# Patient Record
Sex: MALE | Race: WHITE | NOT HISPANIC OR LATINO | URBAN - METROPOLITAN AREA
[De-identification: names, ages, dates, MRNs, and addresses within clinical notes are randomized per-mention and may not be internally consistent; named-entity substitution may affect disease eponyms.]

---

## 2024-10-16 ENCOUNTER — OFFICE VISIT (OUTPATIENT)
Dept: URGENT CARE | Age: 49
End: 2024-10-16
Payer: COMMERCIAL

## 2024-10-16 VITALS
DIASTOLIC BLOOD PRESSURE: 84 MMHG | WEIGHT: 155 LBS | OXYGEN SATURATION: 97 % | RESPIRATION RATE: 16 BRPM | TEMPERATURE: 97.1 F | SYSTOLIC BLOOD PRESSURE: 150 MMHG | HEART RATE: 76 BPM

## 2024-10-16 DIAGNOSIS — S01.312A LACERATION OF AURICLE OF LEFT EAR, INITIAL ENCOUNTER: Primary | ICD-10-CM

## 2024-10-16 PROCEDURE — 99203 OFFICE O/P NEW LOW 30 MIN: CPT | Performed by: PHYSICIAN ASSISTANT

## 2024-10-16 PROCEDURE — 1036F TOBACCO NON-USER: CPT | Performed by: PHYSICIAN ASSISTANT

## 2024-10-16 PROCEDURE — 12013 RPR F/E/E/N/L/M 2.6-5.0 CM: CPT | Performed by: PHYSICIAN ASSISTANT

## 2024-10-16 RX ORDER — IBUPROFEN 800 MG/1
800 TABLET ORAL ONCE
Status: SHIPPED | OUTPATIENT
Start: 2024-10-16

## 2024-10-16 RX ORDER — LAMOTRIGINE 100 MG/1
TABLET, EXTENDED RELEASE ORAL
COMMUNITY

## 2024-10-16 RX ORDER — IBUPROFEN 800 MG/1
800 TABLET ORAL EVERY 8 HOURS PRN
Qty: 30 TABLET | Refills: 0 | Status: SHIPPED | OUTPATIENT
Start: 2024-10-16 | End: 2024-10-26

## 2024-10-16 RX ORDER — CEPHALEXIN 500 MG/1
500 CAPSULE ORAL 2 TIMES DAILY
Qty: 14 CAPSULE | Refills: 0 | Status: SHIPPED | OUTPATIENT
Start: 2024-10-16 | End: 2024-10-23
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
## 2024-10-16 NOTE — PATIENT INSTRUCTIONS
SUTURE REMOVAL: HERE on 10/23 (Wednesday) with Dada Bella PA-C    Wash the wound gently with soap and water daily. Starting tomorrow you may leave the wound open to the air, but apply Bacitracin ointment 2x/day for a few days.    Return sooner for recheck if any worsening redness, swelling, pain, poor appearance of ear.    Take antibiotics as prescribed.

## 2024-10-23 ENCOUNTER — OFFICE VISIT (OUTPATIENT)
Dept: URGENT CARE | Age: 49
End: 2024-10-23
Payer: COMMERCIAL

## 2024-10-23 VITALS
OXYGEN SATURATION: 97 % | TEMPERATURE: 98.2 F | HEIGHT: 66 IN | WEIGHT: 156 LBS | DIASTOLIC BLOOD PRESSURE: 68 MMHG | BODY MASS INDEX: 25.07 KG/M2 | RESPIRATION RATE: 18 BRPM | SYSTOLIC BLOOD PRESSURE: 137 MMHG | HEART RATE: 87 BPM

## 2024-10-23 DIAGNOSIS — Z48.02 ENCOUNTER FOR REMOVAL OF SUTURES: Primary | ICD-10-CM

## 2024-10-23 DIAGNOSIS — S01.312D LACERATION OF HELIX OF LEFT EAR, SUBSEQUENT ENCOUNTER: ICD-10-CM

## 2024-10-23 RX ORDER — CEPHALEXIN 500 MG/1
500 CAPSULE ORAL 2 TIMES DAILY
Qty: 14 CAPSULE | Refills: 0 | Status: SHIPPED | OUTPATIENT
Start: 2024-10-23 | End: 2024-10-30

## 2024-10-23 ASSESSMENT — PAIN SCALES - GENERAL: PAINLEVEL_OUTOF10: 0-NO PAIN

## 2024-10-23 NOTE — PROGRESS NOTES
"Subjective   Patient ID: Israel Ortiz is a 49 y.o. male. They present today with a chief complaint of Wound Check.    Patient disposition: Home    HISTORY OF PRESENT ILLNESS:    Adult male returns for removal of sutures in L ear placed here by me 7d ago. States wound healing well and no pain.    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 He uses smokeless tobacco. He reports that he does not currently use alcohol.    Review of Systems    Negative except as documented in the History of Present Illness.                             Objective    Vitals:    10/23/24 1238   BP: 137/68   Pulse: 87   Resp: 18   Temp: 36.8 °C (98.2 °F)   TempSrc: Oral   SpO2: 97%   Weight: 70.8 kg (156 lb)   Height: 1.676 m (5' 6\")     No LMP for male patient.      PHYSICAL EXAMINATION:    CONSTITUTIONAL: well-appearing, nontoxic    EYES:   No scleral icterus or orbital trauma noted. No conjunctival injection.     ENT:  Well-healed laceration of L helix of ear, with 7 intact prolene blue sutures. There is mild erythema and increased calor of the L auricle/helix region wo fluctuance or purulence.    LUNGS:  No respiratory distress noted. No coughing noted.    CARDIOVASCULAR:   " Well-perfused.    ABDOMEN:  Nonobese, nondistended     MUSCULOSKELETAL: PEREZ with equal strength. Gait [observed to be normal.]    SKIN:   Good color, with no significant rashes, pallor, cyanosis, wounds.    NEURO:  Normal baseline mental status. No obvious neurological deficits, normal sensation and strength bilaterally.    PSYCH: Appropriate mood and affect.         ---------------------------------------------------------------         MDM:  Sutures removed. Some mild signs of infxn and Keflex new round was Rxd to ensure optimal healing. Will fu here PRN.        Suture Removal    Date/Time: 10/23/2024 1:06 PM    Performed by: Dada Bella PA-C  Authorized by: Dada Bella PA-C    Consent:     Consent obtained:  Verbal    Consent given by:  Patient    Risks, benefits, and alternatives were discussed: yes      Risks discussed:  Wound separation, pain and bleeding    Alternatives discussed:  Delayed treatment  Universal protocol:     Procedure explained and questions answered to patient or proxy's satisfaction: yes      Relevant documents present and verified: yes      Patient identity confirmed:  Verbally with patient  Location:     Location:  Head/neck    Head/neck location:  Ear    Ear location:  L ear  Procedure details:     Wound appearance:  Good wound healing, red, warm and moist    Number of sutures removed:  7  Post-procedure details:     Post-removal:  Antibiotic ointment applied    Procedure completion:  Tolerated well, no immediate complications          Diagnostic study results (if any) were reviewed by Dada Bella PA-C.    No results found for this visit on 10/23/24.     Assessment/Plan   Allergies, medications, history, and pertinent labs/EKGs/Imaging reviewed by Dada Bella PA-C.     Orders and Diagnoses  There are no diagnoses linked to this encounter.    Medical Admin Record      Follow Up Instructions  No follow-ups on file.    Electronically signed by Dada Bella PA-C  1:01  PM

## 2025-04-27 ENCOUNTER — OFFICE VISIT (OUTPATIENT)
Dept: URGENT CARE | Age: 50
End: 2025-04-27
Payer: COMMERCIAL

## 2025-04-27 VITALS
OXYGEN SATURATION: 96 % | BODY MASS INDEX: 24.11 KG/M2 | TEMPERATURE: 96.7 F | DIASTOLIC BLOOD PRESSURE: 81 MMHG | RESPIRATION RATE: 18 BRPM | SYSTOLIC BLOOD PRESSURE: 124 MMHG | HEIGHT: 66 IN | HEART RATE: 96 BPM | WEIGHT: 150 LBS

## 2025-04-27 DIAGNOSIS — T39.314A IBUPROFEN OVERDOSE, UNDETERMINED INTENT, INITIAL ENCOUNTER: Primary | ICD-10-CM

## 2025-04-27 DIAGNOSIS — R10.9 ACUTE ABDOMINAL PAIN: ICD-10-CM

## 2025-04-27 DIAGNOSIS — R19.5 RED STOOL: ICD-10-CM

## 2025-04-27 PROCEDURE — 3008F BODY MASS INDEX DOCD: CPT

## 2025-04-27 PROCEDURE — 99215 OFFICE O/P EST HI 40 MIN: CPT

## 2025-04-27 ASSESSMENT — ENCOUNTER SYMPTOMS
BLOOD IN STOOL: 1
AGITATION: 0
RECTAL PAIN: 0
FATIGUE: 0
CONSTITUTIONAL NEGATIVE: 1
CONFUSION: 0
CARDIOVASCULAR NEGATIVE: 1
ABDOMINAL DISTENTION: 0
ABDOMINAL PAIN: 1
DIARRHEA: 0
EYES NEGATIVE: 1
VOMITING: 1
NAUSEA: 0
ANAL BLEEDING: 0
FEVER: 0
SLEEP DISTURBANCE: 1
FACIAL ASYMMETRY: 0
ACTIVITY CHANGE: 0
MUSCULOSKELETAL NEGATIVE: 1
CONSTIPATION: 0
RESPIRATORY NEGATIVE: 1
DIZZINESS: 0
APPETITE CHANGE: 0
CHILLS: 0
NEUROLOGICAL NEGATIVE: 1

## 2025-04-27 ASSESSMENT — PAIN SCALES - GENERAL: PAINLEVEL_OUTOF10: 8

## 2025-04-27 NOTE — PROGRESS NOTES
Subjective   Patient ID: Israel Ortiz is a 50 y.o. male. They present today with a chief complaint of Vomiting (Have not been able to eat in 2 days. Can't sleep. Red stool. Took entire bottle (30 pills) 200 MG or pills 3 days ago. H/O addiction. ).    History of Present Illness  C/o abdominal pain s/s x 2 day(s). Rates 8/10 mid upper abdominal pain. States he took 30 tablets of ibuprofen all at once 2 days ago. Abdominal pain since that time. Endorses red stool but unsure if it blood. He has not been able to eat. Denies SI and HI. Denies any CP, SOB, HA, fever otherwise.      History provided by:  Patient  Vomiting  Associated symptoms: abdominal pain    Associated symptoms: no chills, no diarrhea and no fever        Past Medical History  Allergies as of 04/27/2025    (No Known Allergies)       Prescriptions Prior to Admission[1]     Medical History[2]    Surgical History[3]     reports that he has been smoking cigarettes. He uses smokeless tobacco. He reports that he does not currently use alcohol. He reports that he does not currently use drugs.    Review of Systems  Review of Systems   Constitutional: Negative.  Negative for activity change, appetite change, chills, fatigue and fever.   HENT: Negative.     Eyes: Negative.    Respiratory: Negative.     Cardiovascular: Negative.    Gastrointestinal:  Positive for abdominal pain, blood in stool and vomiting. Negative for abdominal distention, anal bleeding, constipation, diarrhea, nausea and rectal pain.   Musculoskeletal: Negative.    Skin: Negative.    Neurological: Negative.  Negative for dizziness and facial asymmetry.   Psychiatric/Behavioral:  Positive for sleep disturbance. Negative for agitation, confusion, self-injury and suicidal ideas.    All other systems reviewed and are negative.                                 Objective    Vitals:    04/27/25 1235   BP: 124/81   BP Location: Right arm   Patient Position: Sitting   BP Cuff Size: Adult   Pulse: 96  "  Resp: 18   Temp: 35.9 °C (96.7 °F)   TempSrc: Temporal   SpO2: 96%   Weight: 68 kg (150 lb)   Height: 1.676 m (5' 6\")     No LMP for male patient.    Physical Exam  Vitals and nursing note reviewed.   Constitutional:       General: He is not in acute distress.     Appearance: Normal appearance. He is normal weight. He is not ill-appearing, toxic-appearing or diaphoretic.   HENT:      Head: Normocephalic and atraumatic.      Nose: Nose normal.      Mouth/Throat:      Mouth: Mucous membranes are moist.      Pharynx: Oropharynx is clear.   Eyes:      Extraocular Movements: Extraocular movements intact.      Pupils: Pupils are equal, round, and reactive to light.   Cardiovascular:      Rate and Rhythm: Normal rate and regular rhythm.      Pulses: Normal pulses.      Heart sounds: Normal heart sounds.   Pulmonary:      Effort: Pulmonary effort is normal.      Breath sounds: Normal breath sounds.   Abdominal:      General: Abdomen is flat. Bowel sounds are increased. There is no distension. There are no signs of injury.      Palpations: Abdomen is soft. There is no hepatomegaly, splenomegaly, mass or pulsatile mass.      Tenderness: There is abdominal tenderness in the epigastric area. There is no right CVA tenderness, left CVA tenderness, guarding or rebound. Negative signs include Jefferson's sign, Rovsing's sign, McBurney's sign, psoas sign and obturator sign.      Hernia: No hernia is present.   Skin:     General: Skin is warm and dry.   Neurological:      Mental Status: He is alert and oriented to person, place, and time.   Psychiatric:         Mood and Affect: Mood normal.         Behavior: Behavior normal.         Procedures    Point of Care Test & Imaging Results from this visit  No results found for this visit on 04/27/25.   Imaging  No results found.    Cardiology, Vascular, and Other Imaging  No other imaging results found for the past 2 days      Diagnostic study results (if any) were reviewed by Chad HERNANDES" Gomez, APRN-CNP.    Assessment/Plan   Allergies, medications, history, and pertinent labs/EKGs/Imaging reviewed by MARIO Miles.     Medical Decision Making  D/t acute abdominal pain and having taken 6000mg of ibuprofen all at once and being symptomatic, sending to the ED for work up/evaluation. Patient is agrees to go by EMS. EMS arrived and patient was able to walk out under his own power. Patient verbalized understanding and agreed with the plan of care.      Orders and Diagnoses  Diagnoses and all orders for this visit:  Ibuprofen overdose, undetermined intent, initial encounter  Acute abdominal pain  Red stool      Medical Admin Record      Patient disposition: ED    Electronically signed by MARIO Miles  1:42 PM         [1] (Not in a hospital admission)   [2]   Past Medical History:  Diagnosis Date    Alcohol dependence, uncomplicated (Multi) 10/13/2015    Alcohol dependence    Opioid dependence, uncomplicated (Multi) 10/13/2015    Opiate addiction    Other specified postprocedural states 10/13/2015    History of thumb surgery    Sedative, hypnotic or anxiolytic abuse, 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